# Patient Record
Sex: FEMALE | Race: WHITE | URBAN - METROPOLITAN AREA
[De-identification: names, ages, dates, MRNs, and addresses within clinical notes are randomized per-mention and may not be internally consistent; named-entity substitution may affect disease eponyms.]

---

## 2022-07-27 ENCOUNTER — APPOINTMENT (RX ONLY)
Dept: URBAN - METROPOLITAN AREA CLINIC 41 | Facility: CLINIC | Age: 47
Setting detail: DERMATOLOGY
End: 2022-07-27

## 2022-07-27 DIAGNOSIS — Z41.9 ENCOUNTER FOR PROCEDURE FOR PURPOSES OTHER THAN REMEDYING HEALTH STATE, UNSPECIFIED: ICD-10-CM | Status: INADEQUATELY CONTROLLED

## 2022-07-27 PROCEDURE — ? DYSPORT

## 2022-07-27 PROCEDURE — ? COSMETIC CONSULTATION: COOLPEEL

## 2022-07-27 PROCEDURE — ? COSMETIC CONSULTATION - PULSED-DYE LASER

## 2022-07-27 PROCEDURE — ? COSMETIC CONSULTATION: BOTOX

## 2022-07-27 PROCEDURE — ? TREATMENT REGIMEN

## 2022-07-27 PROCEDURE — ? ADDITIONAL NOTES

## 2022-07-27 PROCEDURE — ? COSMETIC CONSULTATION: DYSPORT

## 2022-07-27 PROCEDURE — ? COSMETIC CONSULTATION: PRP

## 2022-07-27 PROCEDURE — ? COSMETIC CONSULTATION: FILLERS

## 2022-07-27 PROCEDURE — ? PATIENT SPECIFIC COUNSELING

## 2022-07-27 PROCEDURE — ? COSMETIC CONSULTATION: IPL

## 2022-07-27 NOTE — PROCEDURE: PATIENT SPECIFIC COUNSELING
EG counsels that pt is a candidate for filler around the mouth and cheeks, likely 3-4 syringes. EG recommends restalyne lift and restalyne define for this pt. \\n\\nPt is also a candidate for VBeam tx, likely 1-3 treatments a month apart each.
Detail Level: Zone

## 2022-07-27 NOTE — PROCEDURE: ADDITIONAL NOTES
Additional Notes: EG counsels Pt can do Botox, filler, and VBeam laser, but after laser, should not do Botox or filler injections until 2-4 weeks after the laser tx. Pt should also avoid sun exposure after these procedures. Botox and filler can be done on the same day. \\n\\nEG counsels that Dysport takes 2 weeks to get full effect but can start to see results in 3-5 days. Botox also takes 2 weeks to get full effect but can start to see results in 5-7 days. Both are the same price and both have about the same longevity. EG counsels that there are other botulinum toxins out there but EG herself does not recommend or use anything other than Botox or Dysport.\\n\\nEG advises that after Botox or Dysport, pt cannot exercise or go on a plane for 24 hours, and should not lie down for 4 hours.\\n\\nPt notes that she saw no results from previous  cheeks done out of office. EG counsels that pt may not have received enough of the filler, as the only area common to see results in with just one syringe is the lips. EG encourages pt that she is still a candidate for filler, but pt should wait to start until back from vacation due to sun exposure and time in the hot tub/sauna.\\n\\nPt notes she has had VBeam and IPL before, but EG counsels that VBeam will be more effective for this patient due to the redness in her skin. IPL targets brown spots. Pt is also concerned about skin texture which EG counsels can be addressed with CoolPeel.
Render Risk Assessment In Note?: no
Detail Level: Simple
Additional Notes: Patient consent was obtained to proceed with the visit and recommended plan of care after discussion of all risks and benefits, including the risks of COVID-19 exposure.
Additional Notes: EG measured pt:\\n\\n12 frontalis\\n20 glabella\\n24 crows \\n\\n(56 Dysport units total at $750 per syringe)

## 2022-07-27 NOTE — HPI: COSMETIC (BOTOX)
Additional History: Pt presents for a cosmetic consultation. Pt has gotten Botox before at another office and would like to restart. Has also had VBeam done before and liked the results. Pt has also had filler done before but didn’t see any results at all.

## 2022-07-27 NOTE — PROCEDURE: DYSPORT
Price (Use Numbers Only, No Special Characters Or $): 1009 Price (Use Numbers Only, No Special Characters Or $): 1002

## 2023-03-16 ENCOUNTER — APPOINTMENT (RX ONLY)
Dept: URBAN - METROPOLITAN AREA CLINIC 41 | Facility: CLINIC | Age: 48
Setting detail: DERMATOLOGY
End: 2023-03-16

## 2023-03-16 DIAGNOSIS — Z41.9 ENCOUNTER FOR PROCEDURE FOR PURPOSES OTHER THAN REMEDYING HEALTH STATE, UNSPECIFIED: ICD-10-CM

## 2023-03-16 PROCEDURE — ? DYSPORT

## 2023-03-16 PROCEDURE — ? ADDITIONAL NOTES

## 2023-03-16 PROCEDURE — ? COSMETIC CONSULTATION - PULSED-DYE LASER

## 2023-03-16 NOTE — PROCEDURE: ADDITIONAL NOTES
Detail Level: Simple
Additional Notes: Patient consent was obtained to proceed with the visit and recommended plan of care after discussion of all risks and benefits, including the risks of COVID-19 exposure.
Additional Notes: Scar :  well healed scar with NER\\n\\nPlan : Counseling. I counseled the patient regarding the following:\\nSkin Care: Scars become less noticeable over time.\\nExpectations: Scars are permanent marks on the body commonly seen after trauma or surgical procedures. Scars from excisional sites should be monitored for any recurrences.\\nContact Office if: If you notice discoloration or a bump arising from a previously stable scar.\\nPt recently fell and received stitches on her forehead. Pt asks about red light therapy, EG notes there aren?t great studies on red light therapy for scar tissue. EG notes there is currently no data to show harm. Texture can be improved with microneedling, CO2 laser, etc. EG notes the color may be more permanent.\\n\\nI recommended the following: \\nScar Massage
Render Risk Assessment In Note?: no

## 2023-03-16 NOTE — PROCEDURE: DYSPORT
Price (Use Numbers Only, No Special Characters Or $): 1006 Price (Use Numbers Only, No Special Characters Or $): 1000